# Patient Record
Sex: MALE | Race: WHITE | HISPANIC OR LATINO | URBAN - METROPOLITAN AREA
[De-identification: names, ages, dates, MRNs, and addresses within clinical notes are randomized per-mention and may not be internally consistent; named-entity substitution may affect disease eponyms.]

---

## 2020-01-12 ENCOUNTER — EMERGENCY (EMERGENCY)
Facility: HOSPITAL | Age: 30
LOS: 0 days | Discharge: HOME | End: 2020-01-12
Attending: EMERGENCY MEDICINE
Payer: COMMERCIAL

## 2020-01-12 VITALS
TEMPERATURE: 98 F | HEART RATE: 60 BPM | DIASTOLIC BLOOD PRESSURE: 84 MMHG | OXYGEN SATURATION: 100 % | RESPIRATION RATE: 18 BRPM | SYSTOLIC BLOOD PRESSURE: 131 MMHG

## 2020-01-12 VITALS — WEIGHT: 154.98 LBS

## 2020-01-12 DIAGNOSIS — S61.211A LACERATION WITHOUT FOREIGN BODY OF LEFT INDEX FINGER WITHOUT DAMAGE TO NAIL, INITIAL ENCOUNTER: ICD-10-CM
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
## 2020-01-12 DIAGNOSIS — Z23 ENCOUNTER FOR IMMUNIZATION: ICD-10-CM
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
## 2020-01-12 PROCEDURE — 99283 EMERGENCY DEPT VISIT LOW MDM: CPT

## 2020-01-12 PROCEDURE — 90471 IMMUNIZATION ADMIN: CPT

## 2020-01-12 PROCEDURE — 99283 EMERGENCY DEPT VISIT LOW MDM: CPT | Mod: 25

## 2020-01-12 PROCEDURE — 90715 TDAP VACCINE 7 YRS/> IM: CPT

## 2020-01-12 RX ORDER — CEFAZOLIN SODIUM 1 G
1000 VIAL (EA) INJECTION ONCE
Refills: 0 | Status: DISCONTINUED | OUTPATIENT
Start: 2020-01-12 | End: 2020-01-12

## 2020-01-12 RX ORDER — TETANUS TOXOID, REDUCED DIPHTHERIA TOXOID AND ACELLULAR PERTUSSIS VACCINE, ADSORBED 5; 2.5; 8; 8; 2.5 [IU]/.5ML; [IU]/.5ML; UG/.5ML; UG/.5ML; UG/.5ML
0.5 SUSPENSION INTRAMUSCULAR ONCE
Refills: 0 | Status: COMPLETED | OUTPATIENT
Start: 2020-01-12 | End: 2020-01-12

## 2020-01-12 RX ADMIN — TETANUS TOXOID, REDUCED DIPHTHERIA TOXOID AND ACELLULAR PERTUSSIS VACCINE, ADSORBED 0.5 MILLILITER(S): 5; 2.5; 8; 8; 2.5 SUSPENSION INTRAMUSCULAR at 12:22

## 2020-01-12 NOTE — ED ADULT NURSE NOTE - OBJECTIVE STATEMENT
Pt is a 29y male, A & O 4 VSS presents to ED s/p finger laceration while slicing meat. Left hand second digit. Bleeding controlled with gauze. Finger intact. + pulse, motor & sensation.

## 2020-01-12 NOTE — ED STATDOCS - CLINICAL SUMMARY MEDICAL DECISION MAKING FREE TEXT BOX
Laceration cannot be sutured due to flap of skin, will control bleeding with QuickClot, give tetanus and reassess.

## 2020-01-12 NOTE — ED STATDOCS - OBJECTIVE STATEMENT
28 y/o male with no relevant PMHx presents to ED s/p injury to left index finger PTA. Co-worker at bedside acting as  for pt, says pt cut his finger on  at work at approximately 09:30. Evaluated at [facility] PTA and sent to ED for further care and evaluation. Presents with skin avulsion to left 2nd finger, volar aspect. Unknown if tetanus UTD.

## 2020-01-12 NOTE — ED STATDOCS - ATTENDING CONTRIBUTION TO CARE
I, Kris Morelos, performed the initial face to face bedside interview with this patient regarding history of present illness, review of symptoms and relevant past medical, social and family history.  I completed an independent physical examination.  I was the initial provider who evaluated this patient. I have signed out the follow up of any pending tests (i.e. labs, radiological studies) to the ACP.  I have communicated the patient’s plan of care and disposition with the ACP.  The history, relevant review of systems, past medical and surgical history, medical decision making, and physical examination was documented by the scribe in my presence and I attest to the accuracy of the documentation.

## 2020-01-12 NOTE — ED ADULT NURSE NOTE - NSIMPLEMENTINTERV_GEN_ALL_ED
Implemented All Universal Safety Interventions:  [place] to call system. Call bell, personal items and telephone within reach. Instruct patient to call for assistance. Room bathroom lighting operational. Non-slip footwear when patient is off stretcher. Physically safe environment: no spills, clutter or unnecessary equipment. Stretcher in lowest position, wheels locked, appropriate side rails in place.

## 2020-01-12 NOTE — ED STATDOCS - PROGRESS NOTE DETAILS
30 y/o male with no relevant PMHx presents to ED s/p injury to left index finger PTA. Co-worker at bedside acting as  for pt, says pt cut his finger on  at work at approximately 09:30. Evaluated at [facility] PTA and sent to ED for further care and evaluation. Presents with skin avulsion to left 2nd finger, volar aspect. Unknown if tetanus UTD.  PE: 1cm avulsion to tip of 2nd finger  Plan: will apply quick clot and reassess, update Tdap  Carolyn Zhang PA-C quick clot placed and bleeding controlled, wound care and S&S of infection discussed, Tdap updated, plan to d/c home with outpt f/u with PMD for wound check, pt agreeable to d/c and plan of care, all questions answered, return precautions given  Carolyn Zhang PA-C

## 2020-01-12 NOTE — ED STATDOCS - PATIENT PORTAL LINK FT
You can access the FollowMyHealth Patient Portal offered by [facility] by registering at the following website: [email or web address]. By joining [employer]’s FollowMyHealth portal, you will also be able to view your health information using other applications (apps) compatible with our system.

## 2023-04-20 NOTE — ED ADULT TRIAGE NOTE - NS ED NURSE DIRECT TO ROOM YN
[de-identified] : EXAM: [id number] - MR SPINE THORACIC WAW IC - ORDERED BY: KIARA KWON\par \par \par PROCEDURE DATE: 04/04/2023\par \par \par \par INTERPRETATION: Meningioma. Assess for stability.\par \par Technique: MRI of the thoracic spine was performed utilizing sagittal T1, axial and sagittal T2-weighted and axial gradient echo images as well as sagittal STIR images. Following the administration of 10 cc of Gadavist, sagittal and axial fat-saturated T1-weighted images were obtained.\par \par Comparison is made to a prior MRI of the thoracic spine performed on 7/16/2022 and CT of the abdomen and pelvis performed on 7/14/2022.\par \par Findings: There is trace anterolisthesis of T1 on T2 and T2 on T3, unchanged. Again noted is retrolisthesis of L1 on L2 (approximately 3 mm), unchanged. There is kyphosis of the thoracic spine, unchanged. There is a small hemangioma within the T9 vertebral body, unchanged. The vertebral bodies are of normal height. There is loss of disc height and T2 signal throughout the discs spaces are within normal limits. Evaluation of the spinal cord demonstrates no evidence of abnormal signal changes.\par \par Evaluation of the individual levels demonstrates at the T9/T10 level again noted is approximately 1.1 cm cranial caudal by 0.6 cm x 0.8 cm wide extra medullary partially calcified lesion along the dorsal inferior endplate of T9, unchanged. This lesion demonstrates enhancement (image #8 series 24) and is indenting the ventral spinal cord. There is CSF seen posteriorly. There is no definite evidence of abnormal signal changes within the spinal cord.\par \par At the T1/T2 and T2/T3 levels there are minimal central disc protrusions contacting the ventral thecal sac. The bilateral neuroforamen are patent. There is no evidence of spinal cord compression.\par \par At the T6/T7, T7/T8 and T8/T9 levels there are very tiny central disc protrusion contacting the ventral thecal sac. The bilateral neuroforamen are patent. There is no evidence of spinal cord compression.\par \par At the L1/L2 level there is retrolisthesis with a superimposed disc osteophyte complex and central disc protrusion flattening the ventral thecal sac. There is severe bilateral foraminal narrowing.\par \par The remaining levels demonstrate no evidence of a focal disc herniation or spinal cord compression.\par \par There is an approximately 1 cm nodule seen along the left adrenal gland (image #44 series 23. There is a partially cystic lesion seen in the region of the pancreas (image #19 through 28).\par \par \par \par Impression:\par \par Approximately 1.1 cm extramedullary partially calcified enhancing lesion along the dorsal findings inferior endplate of T9 flattening the ventral spinal cord, unchanged; findings may represent calcified superiorly extruded disc material or a small meningioma.\par \par --- End of Report ---\par \par \par \par \par \par \par NUSRAT PINEDO MD; Attending Radiologist\par This document has been electronically signed. Apr 11 2023 1:02PM Yes

## 2025-07-03 NOTE — ED ADULT NURSE NOTE - NS ED NURSE LEVEL OF CONSCIOUSNESS MENTAL STATUS
Huddled with Lucho Alvarez, and provider requested to route refill to basket to refill norco for migraines.    Med and pharmacy pended.    Estefanía Hernandez RN     Cooperative/Awake/Alert